# Patient Record
Sex: MALE | Race: BLACK OR AFRICAN AMERICAN | NOT HISPANIC OR LATINO | Employment: OTHER | URBAN - METROPOLITAN AREA
[De-identification: names, ages, dates, MRNs, and addresses within clinical notes are randomized per-mention and may not be internally consistent; named-entity substitution may affect disease eponyms.]

---

## 2023-05-09 LAB
CHOLESTEROL (MG/DL) IN SER/PLAS: 238 MG/DL (ref 0–199)
CHOLESTEROL IN HDL (MG/DL) IN SER/PLAS: 76.6 MG/DL
CHOLESTEROL/HDL RATIO: 3.1
CREATININE (MG/DL) IN SER/PLAS: 1.04 MG/DL (ref 0.5–1.3)
ESTIMATED AVERAGE GLUCOSE FOR HBA1C: 117 MG/DL
FOLLITROPIN (IU/L) IN SER/PLAS: 15 IU/L
GFR MALE: 79 ML/MIN/1.73M2
HEMATOCRIT (%) IN BLOOD BY AUTOMATED COUNT: 47.5 % (ref 41–52)
HEMOGLOBIN A1C/HEMOGLOBIN TOTAL IN BLOOD: 5.7 %
LDL: 144 MG/DL (ref 0–99)
LUTEINIZING HORMONE (IU/ML) IN SER/PLAS: 5.4 IU/L
PROLACTIN (UG/L) IN SER/PLAS: 4.1 UG/L (ref 2–18)
TRIGLYCERIDE (MG/DL) IN SER/PLAS: 87 MG/DL (ref 0–149)
VLDL: 17 MG/DL (ref 0–40)

## 2023-05-16 LAB
TESTOSTERONE FREE (CHAN): 84.1 PG/ML (ref 35–155)
TESTOSTERONE,TOTAL,LC-MS/MS: 349 NG/DL (ref 250–1100)

## 2023-06-23 LAB — PROSTATE SPECIFIC AG (NG/ML) IN SER/PLAS: 0.81 NG/ML (ref 0–4)

## 2023-10-06 PROBLEM — N52.9 INABILITY TO ATTAIN ERECTION: Status: ACTIVE | Noted: 2023-10-06

## 2023-10-06 PROBLEM — N52.9 MALE ERECTILE DISORDER: Status: ACTIVE | Noted: 2023-10-06

## 2023-10-06 PROBLEM — E78.00 HYPERCHOLESTEROLEMIA: Status: ACTIVE | Noted: 2023-10-06

## 2023-10-06 RX ORDER — HYDROCHLOROTHIAZIDE 25 MG/1
25 TABLET ORAL
COMMUNITY
Start: 2023-05-03

## 2023-10-06 RX ORDER — SENNOSIDES 8.6 MG/1
1 TABLET ORAL DAILY PRN
COMMUNITY
Start: 2023-08-08

## 2023-10-06 RX ORDER — ERGOCALCIFEROL 1.25 MG/1
CAPSULE ORAL
COMMUNITY
Start: 2023-05-04

## 2023-10-06 RX ORDER — HYDROCHLOROTHIAZIDE 12.5 MG/1
CAPSULE ORAL
COMMUNITY
Start: 2021-11-05

## 2023-10-06 RX ORDER — TADALAFIL 20 MG/1
TABLET ORAL
COMMUNITY
Start: 2023-04-05 | End: 2023-10-09

## 2023-10-06 RX ORDER — DOXYCYCLINE 100 MG/1
100 CAPSULE ORAL 2 TIMES DAILY
COMMUNITY
Start: 2023-09-29

## 2023-10-06 RX ORDER — ESOMEPRAZOLE MAGNESIUM 40 MG/1
CAPSULE, DELAYED RELEASE ORAL
COMMUNITY

## 2023-10-06 RX ORDER — CYCLOBENZAPRINE HCL 10 MG
TABLET ORAL
COMMUNITY
Start: 2021-03-11

## 2023-10-06 RX ORDER — BUPROPION HYDROCHLORIDE 150 MG/1
TABLET ORAL
COMMUNITY
Start: 2021-11-08

## 2023-10-06 RX ORDER — OMEPRAZOLE 40 MG/1
CAPSULE, DELAYED RELEASE ORAL
COMMUNITY
Start: 2023-02-04

## 2023-10-06 RX ORDER — MELOXICAM 15 MG/1
1 TABLET ORAL DAILY PRN
COMMUNITY
Start: 2021-04-13

## 2023-10-06 RX ORDER — ATORVASTATIN CALCIUM 40 MG/1
TABLET, FILM COATED ORAL
COMMUNITY
Start: 2021-10-06

## 2023-10-09 ENCOUNTER — OFFICE VISIT (OUTPATIENT)
Dept: UROLOGY | Facility: HOSPITAL | Age: 68
End: 2023-10-09
Payer: MEDICARE

## 2023-10-09 VITALS
BODY MASS INDEX: 30.61 KG/M2 | DIASTOLIC BLOOD PRESSURE: 85 MMHG | HEIGHT: 67 IN | SYSTOLIC BLOOD PRESSURE: 159 MMHG | HEART RATE: 64 BPM | WEIGHT: 195 LBS

## 2023-10-09 DIAGNOSIS — R39.15 URINARY URGENCY: ICD-10-CM

## 2023-10-09 DIAGNOSIS — N52.9 MALE ERECTILE DISORDER: ICD-10-CM

## 2023-10-09 DIAGNOSIS — N52.9 INABILITY TO ATTAIN ERECTION: ICD-10-CM

## 2023-10-09 LAB
POC APPEARANCE, URINE: CLEAR
POC BILIRUBIN, URINE: NEGATIVE
POC BLOOD, URINE: NEGATIVE
POC COLOR, URINE: YELLOW
POC GLUCOSE, URINE: NEGATIVE MG/DL
POC KETONES, URINE: NEGATIVE MG/DL
POC LEUKOCYTES, URINE: NEGATIVE
POC NITRITE,URINE: NEGATIVE
POC PH, URINE: 7 PH
POC PROTEIN, URINE: NEGATIVE MG/DL
POC SPECIFIC GRAVITY, URINE: 1.02
POC UROBILINOGEN, URINE: 0.2 EU/DL

## 2023-10-09 PROCEDURE — 54235 NJX CORPORA CAVERNOSA RX AGT: CPT | Performed by: UROLOGY

## 2023-10-09 PROCEDURE — 81003 URINALYSIS AUTO W/O SCOPE: CPT | Mod: QW | Performed by: UROLOGY

## 2023-10-09 PROCEDURE — 93980 PENILE VASCULAR STUDY: CPT | Performed by: UROLOGY

## 2023-10-09 PROCEDURE — 1160F RVW MEDS BY RX/DR IN RCRD: CPT | Performed by: UROLOGY

## 2023-10-09 PROCEDURE — 99215 OFFICE O/P EST HI 40 MIN: CPT | Performed by: UROLOGY

## 2023-10-09 PROCEDURE — 1159F MED LIST DOCD IN RCRD: CPT | Performed by: UROLOGY

## 2023-10-09 PROCEDURE — 99215 OFFICE O/P EST HI 40 MIN: CPT | Mod: 25 | Performed by: UROLOGY

## 2023-10-09 RX ORDER — TAMSULOSIN HYDROCHLORIDE 0.4 MG/1
0.4 CAPSULE ORAL DAILY
Qty: 30 CAPSULE | Refills: 2 | Status: SHIPPED | OUTPATIENT
Start: 2023-10-09 | End: 2024-01-07

## 2023-10-09 NOTE — PROGRESS NOTES
HPI  69 yo M here for erectile dysfunction. Has high cholesterol. Slightly elevated blood sugars.      Tried injections 60 mcg of PGE 1, 3 mg of phentolamine and 26 mg of epinephrine.      Last visit 04/05/23:   -continue Cialis 20 mg prn   -will schedule intracavernosal injection and penile duplex ultrasound  -ipp counseling  -PSA wnl     Today's visit:   #Erectile Dysfunction   -here for doppler    -Tried Cialis 20 mg --> States that he is not hard enough for penetration.   -No side effects.      -Duration: About 10 years   -Rigidity of erections: 3/10   -Able to maintain until ejaculation: yes   -Morning Erections: present   -s/p prostatectomy: no   -Hernia Repair? no  -on nitrates/NTG?: no   -smoker? no      -partner - not now      -Tried PDE5is?: Yes   ---Viagra/sildenafil - response: yes does not work that well   ---Cialis/tadalafil- response: yes 20 mg prn 3/10 (currently using)  -Tried penile injections: yes effective but inconvenient (also tried autoinjector)  -[facility]: ineffective  - Libido/Desire: decreased  - been on Testosterone /anabolic steroids? no   -Pain or curvature in penis when erect: none   -Premature or delayed ejaculation: none.       #luts  -decreased stream  -no hematuria  -was on flomax long time ago, not recently\     Labs 05/09/23: P 4.1, Creatinine 1.04, HCT 47.5, A1c 5.7, T 349, Cholesterol 238, .   Labs 06/23/23: PSA 0.81, FSH + LH 15.0/5.4.     Current Medications:  Current Outpatient Medications   Medication Sig Dispense Refill    atorvastatin (Lipitor) 40 mg tablet Take by mouth.      buPROPion XL (Wellbutrin XL) 150 mg 24 hr tablet Take by mouth.      cyclobenzaprine (Flexeril) 10 mg tablet Take by mouth.      doxycycline (Vibramycin) 100 mg capsule Take 1 capsule (100 mg) by mouth twice a day.      ergocalciferol (Vitamin D-2) 1.25 MG (48298 UT) capsule Take 1 capsule 2 times per week for 12 weeks.      esomeprazole (NexIUM) 40 mg DR capsule  Take by mouth.      hydroCHLOROthiazide (HYDRODiuril) 25 mg tablet Take 1 tablet (25 mg) by mouth once daily.      hydroCHLOROthiazide (Microzide) 12.5 mg capsule Take by mouth.      meloxicam (Mobic) 15 mg tablet Take 1 tablet (15 mg) by mouth once daily as needed (pain).      omeprazole (PriLOSEC) 40 mg DR capsule       sennosides (Senokot) 8.6 mg tablet Take 1 tablet (8.6 mg) by mouth once daily as needed for constipation.      tadalafil 20 mg tablet start with 1/2 tablet as needed 2 hours prior to activity. May increase to 1 tab as needed 2 hours prior to activity.       No current facility-administered medications for this visit.      PMH:  Past Medical History:   Diagnosis Date    Other conditions influencing health status     Foot pain, unspecified laterality    Pain in unspecified finger(s)     Finger pain       PSH:  Past Surgical History:   Procedure Laterality Date    OTHER SURGICAL HISTORY  11/29/2021    No history of surgery     FMH:  No family history on file.    Allergies:  No Known Allergies    Physical Exam   Testicles descended bilaterally, vas palpable  Penis without lesions    Procedures  Procedure:  Intracavernosal injection   Penile ultrasound/gray scale   Penile duplex Doppler ultrasound     Indication: Erectile dysfunction refractory to PDE5 inhibitors / Peyronies Disease    Penile Ultrasound: Morphologic and gray scale evaluation of the penis     1.- Tunical integrity:    Normal: smooth, continuous with thickness < 2 mm       2.- Vascular integrity:     Grade 2: smooth vessel wall, but with short interruptions       3.- Erectile tissue integrity:   Fibrotic; patchy hypoechoic areas or coalesced central hypoechoic defect       Medication:     10 units of mixture # 9      Penile rigidity:   30%   Penile curvature: none   Phenylephrine: none     Penile duplex Doppler ultrasound:             Right     Left   Cavernosal artery diameters:            0.78 mm             0.82 mm   Peak systolic  velocities:  28.21 cm/sec                 36.99 cm/sec    End diastolic velocities:    8.26 cm/sec                   14.77 cm/sec       Impression: Severe erectile dysfunction secondary to  corporo veno-occlusive dysfunction refractory to medical management with PDE's (Viagra, Cialis)    Plan:     Intracavernosal therapy. The patient was instructed how to self inject inject intracavernosal therapy and instructed to start injecting 10 units of mixture 13. He may titrate the dose up or down by 5 units to maximize penile rigidity and duration of erection.      The patient was also counseled extensively that today or while on injections If he develops a priapism / erection over 4 hours he was ask to return to the office or to the emergency room immediately. Educational material was provided and all his questions and concerns were addressed.      Assesment/Plan  #Severe Erectile Dysfunction refractory to PDE5is  -start injecting 10 units of mixture 13. He may titrate the dose up or down by 5 units to maximize penile rigidity and duration of erection. Max dose 50 units.  -will likely need implant  -stop Cialis 20mg     #Prostate cancer screening  -PSA wnl    #LUTS  -start flomax daily - med counseling done in detail     fu in 2 months.      Scribe Attestation  By signing my name below, I, Liana Song-Catrachito , Lex   attest that this documentation has been prepared under the direction and in the presence of Cornelia Villa MD.

## 2023-10-10 ENCOUNTER — DOCUMENTATION (OUTPATIENT)
Dept: CARDIOLOGY | Facility: HOSPITAL | Age: 68
End: 2023-10-10
Payer: MEDICARE

## 2023-10-10 DIAGNOSIS — Z00.6 RESEARCH EXAM: Primary | ICD-10-CM

## 2023-10-16 ENCOUNTER — HOSPITAL ENCOUNTER (OUTPATIENT)
Dept: RADIOLOGY | Facility: HOSPITAL | Age: 68
Discharge: HOME | End: 2023-10-16
Payer: MEDICARE

## 2023-10-16 DIAGNOSIS — E78.00 HYPERCHOLESTEROLEMIA: Primary | ICD-10-CM

## 2023-10-16 DIAGNOSIS — Z00.6 RESEARCH SUBJECT: ICD-10-CM

## 2023-10-16 DIAGNOSIS — E78.00 HYPERCHOLESTEROLEMIA: ICD-10-CM

## 2023-10-16 DIAGNOSIS — Z00.6 RESEARCH EXAM: ICD-10-CM

## 2023-10-16 LAB
EST. AVERAGE GLUCOSE BLD GHB EST-MCNC: 117 MG/DL
HBA1C MFR BLD: 5.7 %
LDLC SERPL DIRECT ASSAY-MCNC: 124 MG/DL (ref 0–129)

## 2023-10-16 PROCEDURE — 83721 ASSAY OF BLOOD LIPOPROTEIN: CPT | Mod: CMCLAB | Performed by: INTERNAL MEDICINE

## 2023-10-16 PROCEDURE — 75571 CT HRT W/O DYE W/CA TEST: CPT | Mod: MG

## 2023-10-16 PROCEDURE — 36415 COLL VENOUS BLD VENIPUNCTURE: CPT | Performed by: INTERNAL MEDICINE

## 2023-10-16 PROCEDURE — 83036 HEMOGLOBIN GLYCOSYLATED A1C: CPT | Performed by: INTERNAL MEDICINE

## 2023-11-02 ENCOUNTER — PATIENT OUTREACH (OUTPATIENT)
Dept: CASE MANAGEMENT | Facility: HOSPITAL | Age: 68
End: 2023-11-02
Payer: MEDICARE

## 2023-11-17 ENCOUNTER — PATIENT OUTREACH (OUTPATIENT)
Dept: CASE MANAGEMENT | Facility: HOSPITAL | Age: 68
End: 2023-11-17
Payer: MEDICARE

## 2023-11-20 ENCOUNTER — PATIENT OUTREACH (OUTPATIENT)
Dept: CASE MANAGEMENT | Facility: HOSPITAL | Age: 68
End: 2023-11-20
Payer: MEDICARE

## 2023-11-20 NOTE — PROGRESS NOTES
The client and I spoke about to set up a date to drop off his watch. We also went over how to reset the watch once it's in his possession.  The client stated that he will call to confirm a meeting on 11/20/2023 for the watch.

## 2023-12-01 ENCOUNTER — DOCUMENTATION (OUTPATIENT)
Dept: CASE MANAGEMENT | Facility: HOSPITAL | Age: 68
End: 2023-12-01
Payer: MEDICARE

## 2023-12-01 ENCOUNTER — PATIENT OUTREACH (OUTPATIENT)
Dept: CASE MANAGEMENT | Facility: HOSPITAL | Age: 68
End: 2023-12-01
Payer: MEDICARE

## 2023-12-01 NOTE — PROGRESS NOTES
11/30/23 arriving at 4:45 pm.am. The client regarding his Rx Cap watch. I informed the client that I rima have to come and reset the Rx Cap HUB in order for the watch to  work. The client asked if that could be done on 11/30/23. I accommodated the client and met him at [address] [place] [address], his physical address. As I entered the kitchen nuck, I saw the clients HUB flashing green. Per the instructions of the Research coordinator, I instructed the client to press the top of the HUB. The client held the HUB to the lights started to flash blue, orange and then back green. Once the green light was stable the client and I attempted to reset the watch. The watch would flash off and on but would not reset to the face of the watch were the time is displayed.  I sent communication to the Research coordinator that the watch was not working. We decided to communicate and trouble shoot via Zoom. I brought the watch back to the office with the HUB. The visit ended at 5:35 pm.

## 2023-12-01 NOTE — PROGRESS NOTES
11/30/23 arriving at 4:45 pm.am. The client regarding his Rx Cap watch. I informed the client that I rima have to come and reset the Rx Cap HUB in order for the watch to  work. The client asked if that could be done on 11/30/23. I accommodated the client and met him at [address] [place] [address], his physical address. As I entered the kitchen nuck, I saw the clients HUB flashing green. Per the instructions of the Research coordinator, I instructed the client to press the top of the HUB. The client held the HUB to the lights started to flash blue, orange and then back green. Once the green light was stable the client and I attempted to reset the watch. The watch would flash off and on but would not reset to the face of the watch were the time is displayed.  I sent communication to the Research coordinator that the watch was not working. We decided to communicate and trouble shoot via Zoom. I brought the watch back to the office with the HUB. The visit ended at 5:35 pm.

## 2023-12-08 ENCOUNTER — PATIENT OUTREACH (OUTPATIENT)
Dept: CASE MANAGEMENT | Facility: HOSPITAL | Age: 68
End: 2023-12-08
Payer: MEDICARE

## 2023-12-08 ENCOUNTER — DOCUMENTATION (OUTPATIENT)
Dept: CASE MANAGEMENT | Facility: HOSPITAL | Age: 68
End: 2023-12-08
Payer: MEDICARE

## 2023-12-08 NOTE — PROGRESS NOTES
Per the client's request. I dropped the client HUIB and wrist device on 12/07/2023 at his home (mail chute). There is no activity showing on the Rx Cap dash board. I will follow up today.

## 2023-12-11 ENCOUNTER — APPOINTMENT (OUTPATIENT)
Dept: UROLOGY | Facility: HOSPITAL | Age: 68
End: 2023-12-11
Payer: MEDICARE

## 2023-12-18 ENCOUNTER — APPOINTMENT (OUTPATIENT)
Dept: UROLOGY | Facility: HOSPITAL | Age: 68
End: 2023-12-18
Payer: MEDICARE

## 2023-12-19 ENCOUNTER — PATIENT OUTREACH (OUTPATIENT)
Dept: CASE MANAGEMENT | Facility: HOSPITAL | Age: 68
End: 2023-12-19
Payer: MEDICARE

## 2024-01-03 ENCOUNTER — PATIENT OUTREACH (OUTPATIENT)
Dept: CASE MANAGEMENT | Facility: HOSPITAL | Age: 69
End: 2024-01-03
Payer: MEDICARE

## 2024-01-11 ENCOUNTER — PATIENT OUTREACH (OUTPATIENT)
Dept: CASE MANAGEMENT | Facility: HOSPITAL | Age: 69
End: 2024-01-11
Payer: MEDICARE

## 2024-01-12 ENCOUNTER — DOCUMENTATION (OUTPATIENT)
Dept: CASE MANAGEMENT | Facility: HOSPITAL | Age: 69
End: 2024-01-12
Payer: MEDICARE

## 2024-01-12 NOTE — PROGRESS NOTES
The client and I met today for our PAL 2 session at 12:47 pm.. The client and I met at the main campus in Bowel 2nd floor.   Today the client and I spoke his goals, losing weight. His goal is 175 lbs. The client and I went over different options to support this weight lost journey. The client has agreed to use his Silver sneakers and work out a little more,. We are going to find  a gym with a steam room.   The client and I spoke of ways to reduce stress,by doing things such and taking walks and he likes listening to music.   We also spoke about diet changes to encourage weight lost and continuous bowel movement without supplements. The client has agreed to eat more high fiber foods and increase his water intake. I gave him a flyer of the food demos at [facility]. The client stated he will try to make it. Also, I will send different informations  on healthier eats options to his email.  The client has agreed to seek [facility] for [employer] literacy.  The client is having his annual colonoscopy within the next week.  The client is social. He spends a lot of time with his  (adult) children and grandchildren. Our visit ended at 1:39 pm. Our next visit is Feb. Via zoom.

## 2024-01-18 ENCOUNTER — DOCUMENTATION (OUTPATIENT)
Dept: CASE MANAGEMENT | Facility: HOSPITAL | Age: 69
End: 2024-01-18
Payer: MEDICARE

## 2024-01-18 NOTE — PROGRESS NOTES
I was jeanne to find a digital literacy class to fit the client's schedule per his request. The class Is held at [facility] [address], from 12:30 pm - 4:30 pm. The client was notified by email per his request.

## 2024-01-23 ENCOUNTER — DOCUMENTATION (OUTPATIENT)
Dept: CASE MANAGEMENT | Facility: HOSPITAL | Age: 69
End: 2024-01-23
Payer: MEDICARE

## 2024-01-23 NOTE — PROGRESS NOTES
I emailed the client information from our last visit. The client wanted information about digital literacy, I gave him information about

## 2024-01-29 ENCOUNTER — DOCUMENTATION (OUTPATIENT)
Dept: CASE MANAGEMENT | Facility: HOSPITAL | Age: 69
End: 2024-01-29
Payer: MEDICARE

## 2024-01-29 NOTE — PROGRESS NOTES
I sent the client information per his request on Digital literacy along with a notice to swtch out is wrist device with Rx Cap.

## 2024-01-31 ENCOUNTER — DOCUMENTATION (OUTPATIENT)
Dept: CASE MANAGEMENT | Facility: HOSPITAL | Age: 69
End: 2024-01-31
Payer: MEDICARE

## 2024-01-31 ENCOUNTER — PATIENT OUTREACH (OUTPATIENT)
Dept: CASE MANAGEMENT | Facility: HOSPITAL | Age: 69
End: 2024-01-31
Payer: MEDICARE

## 2024-01-31 NOTE — PROGRESS NOTES
I called the client to inform him about the switch ou to of his watch. The client could not talk long because he was at work. I asked the client to call once he gets off work.

## 2024-02-02 ENCOUNTER — DOCUMENTATION (OUTPATIENT)
Dept: CASE MANAGEMENT | Facility: HOSPITAL | Age: 69
End: 2024-02-02
Payer: MEDICARE

## 2024-02-02 NOTE — PROGRESS NOTES
The client a  I spoke briefly last night on the phone once I switched his phone out. I called to have him reset his watch per the request of Adal's suggestion from Javier of Rx Cap. The watch was not charged and the time was late so we mutually agreed for the client to come down today by 3:30 p.m at [facility].   The client was asked to bring the HUB as well as his watch to reset.  The client was given a parking ticket.

## 2024-02-08 NOTE — PROGRESS NOTES
The client came to [facility] and brought his Rx Cap wrist device to switch out. His original wrist device only works with an joyce connection. Per Javier of Rx Cap the new device she work once it was reset to the client's HUB. The device did not work. The client sat with me for forty -five minutes and there was no success.  We reached out for assistance from Adal and Kay. Kay did call back, but we still could not figure out the problem. The client then decided to leave the HUB and both wrist devices with me to sort out the issues and link the new devices. I worked with the devices for an additional 30 minutes, with no success. I gave the devices to Adal to resolve the issues during her next RxCap meeting.

## 2024-02-14 ENCOUNTER — PATIENT OUTREACH (OUTPATIENT)
Dept: CASE MANAGEMENT | Facility: HOSPITAL | Age: 69
End: 2024-02-14
Payer: MEDICARE

## 2024-02-14 ENCOUNTER — DOCUMENTATION (OUTPATIENT)
Dept: CASE MANAGEMENT | Facility: HOSPITAL | Age: 69
End: 2024-02-14
Payer: MEDICARE

## 2024-03-08 ENCOUNTER — DOCUMENTATION (OUTPATIENT)
Dept: CASE MANAGEMENT | Facility: HOSPITAL | Age: 69
End: 2024-03-08
Payer: MEDICARE

## 2024-03-08 NOTE — PROGRESS NOTES
The client and I met last week briefly. During our meeting we went over some better diet choice for the client. We also spoke on ways for the client to get more exercise in daily. Today as we wrap up where the visit were we left off. We spoke about alternatives ways to of exercise for movement like dancing. We looked at different pictures of ourselves to see the progression and set a goal as to were he wants his gaol weight to be. We decided on 175#'s. The client agreed on this weight of 175#'s because he felt best at that weight and will not have to invest in new clothing. We dicussed the different places that take silver sneakers and [facility]. The client works so we discussed ways to relax and and listening to music was the best fits for the client at this time after a stressful workday. As the weather change and outdoors can be an option we will go back to other options. The client mention last Friday that his colonoscopy went well with a finding of one polyp, which was removed. The client stated he wanted to try doing smoothing to keep a good flow of his bowel movements. The suggestion came from a friend of the clients. We discussed the ingredients of his smoothies.The smoothies would include moslty fruits and veggies. I agreed with the content of the smoothies as healthy dietary options for him. I also encourage the client to remember to eat healthy meals as maintain healthy water intake as well.  The client and I went over his Rx Cap measurements during our meet. The client was cognitive of the high BP readings. I gave the client literature on blood pressures, lowering his cholesterol and sodium intake. The client and I will meet once more this moth before going into our phone touch base per PAL 2. The client stated he was comfortable with the next steps. The client also asked for support with finding new PCP. The client prefers [facility] [address]. I will follow up with the client prior to making an  appointment with a PCP.

## 2024-03-29 ENCOUNTER — PATIENT OUTREACH (OUTPATIENT)
Dept: CASE MANAGEMENT | Facility: HOSPITAL | Age: 69
End: 2024-03-29
Payer: MEDICARE

## 2024-04-12 ENCOUNTER — DOCUMENTATION (OUTPATIENT)
Dept: CASE MANAGEMENT | Facility: HOSPITAL | Age: 69
End: 2024-04-12
Payer: MEDICARE

## 2024-04-12 NOTE — PROGRESS NOTES
The client and I spoke over the phone for his PAL 2 session. We started at 10:04 am and ended at 10:29 am. The client was working. The client and I spoke about his elevated BP readings. The client stated that he has not been as active,he has been under a little stress and have been over eating as well as eating later, drinking soda, along with not making good food choices when he eats out. The client stated that he is still eating trail mix as a healthy snack. The client's goal is to reduce his weight from 203#'s to 185#'s. His goal is also to start a regular workout regimen to release stress and lose weight. The client and I spoke about his past gastric problem. The client stated that his gastric issues are better with the prescribe medication. The client stated that he is only getting 6 hrs of sleep. I will send him material for better sleep. I spoke to the client about the high sodium in his food choices and the late time he eats. I also follow -up with the progress of his new PCP, it pending for now.   The client and I have discussed ways to reduce stress. I have informed the client the plan and items that we will follow up on during our next session via Zoom so he can see his actual number from his wearable devices. We will also discuss the changes of his diet and see if it help reduce his BP numbers as he start a workout regimen. The next visit is in one month because of the high BP numbers.

## 2024-05-23 ENCOUNTER — DOCUMENTATION (OUTPATIENT)
Dept: CASE MANAGEMENT | Facility: HOSPITAL | Age: 69
End: 2024-05-23
Payer: MEDICARE

## 2024-05-23 NOTE — PROGRESS NOTES
The client has a new PCP (Dr. Aram Rodriguez). The client did mention that he will be obtaining blood work in the next week.The client has an appointment for his next PAL 2 session on June 7,2024 @ 1 pm via Zoom.

## 2024-06-14 ENCOUNTER — PATIENT OUTREACH (OUTPATIENT)
Dept: CASE MANAGEMENT | Facility: HOSPITAL | Age: 69
End: 2024-06-14
Payer: MEDICARE

## 2024-06-20 ENCOUNTER — DOCUMENTATION (OUTPATIENT)
Dept: CASE MANAGEMENT | Facility: HOSPITAL | Age: 69
End: 2024-06-20
Payer: MEDICARE

## 2024-06-21 ENCOUNTER — DOCUMENTATION (OUTPATIENT)
Dept: CASE MANAGEMENT | Facility: HOSPITAL | Age: 69
End: 2024-06-21
Payer: MEDICARE

## 2024-06-21 NOTE — PROGRESS NOTES
The client and I spoke over the phone for his PAL 2 session @10:00 am to 10:30 am. The client was aware of the high readings of his BP. The client stated that the wrist device was malfunctioning , the time was off by 45 mins. The client stated it fixed itself after a few days. The client stated concern of his test results. The client stated that he was not eating as healthy as he should but will increase his veggie and water intake. The client requested that an appointment for an ENT to be made at [facility] for his ears. The client was concern about using sunscreen and his PSA levels. The client was advise to speak with Dr Rodriguez about the questions he presented to me. The client agreed. The client stated that he did not have any other needs or concerns at this time. The client and I will follow up next month.

## 2024-06-21 NOTE — PROGRESS NOTES
PAL2 Monthy Follow Up    CHW PAL2 Session Start Time:  10:00 am  CHW PAL2 Session Stop Time: 10:31 am    Reviewed SMART goals and progress from last session.   Reviewed blood pressure, activity level, weight, wrist  devices.   Reviewed participant's medications and management of medications.     Social Determinants of Health Assessed and Addressed. The client has asked for his test results and appointment to an ENT for his ears.      [employer] Learning:  [LIST WHAT HANDOUT WAS PROVIDED OR REVIEWED] NO      Follow Up   Next session scheduled for: [Date and Time] one month over the phone.

## 2024-07-24 ENCOUNTER — PATIENT OUTREACH (OUTPATIENT)
Dept: CASE MANAGEMENT | Facility: HOSPITAL | Age: 69
End: 2024-07-24
Payer: MEDICARE

## 2024-09-23 ENCOUNTER — DOCUMENTATION (OUTPATIENT)
Dept: CASE MANAGEMENT | Facility: HOSPITAL | Age: 69
End: 2024-09-23
Payer: MEDICARE

## 2024-09-23 NOTE — PROGRESS NOTES
PAL2 Monthy Follow Up    CHW PAL2 Session Start Time:  2:10 pm   CHW PAL2 Session Stop Time: 2:30    Reviewed SMART goals and progress from last session.   Reviewed blood pressure, activity level, weight, and sleep from remote monitoring devices.   Reviewed participant's medications and management of medications.     Social Determinants of Health Assessed and Addressed. N/A      [employer] Learning:  NONE      Follow Up   Next session scheduled for: [Date and Time]TBD 10/2024    The client and I spoke briefly while he was on break at work. The client stated his devices was going in and out. I looked and the last reading was 08/2024. I will inform the Coordinator of the issue to see if they can trouble shoot on the back end. The client has tried resetting his devices more than once with temporary success.

## 2024-11-22 ENCOUNTER — DOCUMENTATION (OUTPATIENT)
Dept: CASE MANAGEMENT | Facility: HOSPITAL | Age: 69
End: 2024-11-22

## 2024-11-22 NOTE — PROGRESS NOTES
PAL2 Monthy Follow Up    CHW PAL2 Session Start Time:  10:39am  CHW PAL2 Session Stop Time: 12:36 pm    Reviewed SMART goals and progress from last session.   Reviewed blood pressure, activity level, weight, and sleep from remote monitoring devices.   Reviewed participant's medications and management of medications.     Social Determinants of Health Assessed and Addressed.      [employer] Learning:  [LIST WHAT HANDOUT WAS PROVIDED OR REVIEWED]      Follow Up    Next session scheduled for: [Date and Time] TBD    The client and I met for an hour trouble shooting his device and completing his survey. The client and I also completed his monthly session. The client is in a good place. He likes is new PCP, he is showing genuine interest in learning technology. The client also is paying a family member to help support him with light house keeping duties. This is a positive move to reduce the dust build up in his home. The client is also beginning to declutter as well.    The client eating choice are the same. He is more adherent with his medication that's reflecting in his BP readings. The client weight is steady and he states he is sleeping a little better. I will follow up with the client at the end of December.

## 2024-12-02 NOTE — PROGRESS NOTES
I spoke to the client he has connected the HUB. His HUB is showing connectivity to his HUB but not the devices. The HUB devices are not lit up, however the devices are still no transmission of data is showing in our Rz Cap dashboard.     I advised the client to reset his HUB and devices. I will follow if not activity is shown on our dashboard.

## 2024-12-31 ENCOUNTER — PATIENT OUTREACH (OUTPATIENT)
Dept: CASE MANAGEMENT | Facility: HOSPITAL | Age: 69
End: 2024-12-31
Payer: MEDICARE

## 2025-01-17 ENCOUNTER — DOCUMENTATION (OUTPATIENT)
Dept: CASE MANAGEMENT | Facility: HOSPITAL | Age: 70
End: 2025-01-17
Payer: MEDICARE

## 2025-01-17 NOTE — PROGRESS NOTES
The client called to reschedule his PAL 2 session. Client ran to a plumbing issue in his home and did not know how long the repair process would take. We R/S the session for next Friday,, January 24,2025. at noon.

## 2025-01-22 ENCOUNTER — PATIENT OUTREACH (OUTPATIENT)
Dept: CASE MANAGEMENT | Facility: HOSPITAL | Age: 70
End: 2025-01-22
Payer: MEDICARE

## 2025-01-31 ENCOUNTER — PATIENT OUTREACH (OUTPATIENT)
Dept: CASE MANAGEMENT | Facility: HOSPITAL | Age: 70
End: 2025-01-31
Payer: MEDICARE